# Patient Record
Sex: FEMALE | Race: WHITE | NOT HISPANIC OR LATINO | Employment: OTHER | URBAN - METROPOLITAN AREA
[De-identification: names, ages, dates, MRNs, and addresses within clinical notes are randomized per-mention and may not be internally consistent; named-entity substitution may affect disease eponyms.]

---

## 2017-04-27 NOTE — PATIENT DISCUSSION
POAG, OU: INTRAOCULAR PRESSURE IS WITHIN ACCEPTABLE LIMITS. PT INSTRUCTED TO CONTINUE LUMIGAN AND TIMOLOL AND RETURN FOR FOLLOW-UP AS SCHEDULED.

## 2017-06-30 NOTE — PATIENT DISCUSSION
POAG, OU: INTRAOCULAR PRESSURE IS WITHIN ACCEPTABLE LIMITS, HVFS STABLE- NONSPECIFIC CHANGES OD- WATCH CLOSELY, OCT STABLE AT LAST EXAM, IOPS LOW TODAY. PT INSTRUCTED TO CONTINUE TIMIOLOL BID OU &amp; LUMIGAN QHS OU AND RETURN FOR FOLLOW-UP AS SCHEDULED.

## 2018-01-18 NOTE — PATIENT DISCUSSION
POAG, OU: INTRAOCULAR PRESSURE IS WITHIN ACCEPTABLE LIMITS. PT INSTRUCTED TO CONTINUE LUMIGAN AND TIMOLOL  OU AND RETURN FOR FOLLOW-UP AS SCHEDULED.

## 2019-05-06 NOTE — PATIENT DISCUSSION
POAG, OU: INTRAOCULAR PRESSURE IS WITHIN ACCEPTABLE LIMITS. PT INSTRUCTED TO CONTINUE LUMIGAN QHS OU AND TIMOLOL BID OU- (ESCRIBED TODAY) AND RETURN FOR FOLLOW-UP AS SCHEDULED.

## 2019-09-13 NOTE — PATIENT DISCUSSION
POAG, OU: INTRAOCULAR PRESSURE IS WITHIN ACCEPTABLE LIMITS. HVFS STABLE DEFECTS OU. PT INSTRUCTED TO CONTINUE LUMIGAN QHS OU AND TIMOLOL BID OU AND RETURN FOR FOLLOW-UP AS SCHEDULED.

## 2019-09-13 NOTE — PATIENT DISCUSSION
New Prescription: timolol maleate (timolol maleate): drops: 0.5% 1 drop twice a day as directed into both eyes 09-

## 2019-11-07 NOTE — PATIENT DISCUSSION
POAG, OU: INTRAOCULAR PRESSURE IS WITHIN ACCEPTABLE LIMITS. OCT STABLE. PT INSTRUCTED TO CONTINUE TIMOLOL BID OU AND LUMIGAN QHS OU AND RETURN FOR FOLLOW-UP AS SCHEDULED.

## 2019-11-07 NOTE — PATIENT DISCUSSION
Continue: timolol maleate (timolol maleate): drops: 0.5% 1 drop twice a day as directed into both eyes 09-

## 2020-06-08 NOTE — PATIENT DISCUSSION
New Prescription: timolol maleate (timolol maleate): drops: 0.5% 1 drop twice a day as directed into both eyes 06-

## 2020-06-08 NOTE — PATIENT DISCUSSION
POAG, OU: INTRAOCULAR PRESSURE IS WITHIN ACCEPTABLE LIMITS. PT INSTRUCTED TO CONTINUE TIMOLOL BID OU AND LUMIGAN QHS OU. E-SCRIBED REFILLS TODAY. RETURN FOR FOLLOW-UP AS SCHEDULED.

## 2021-03-19 NOTE — PATIENT DISCUSSION
Continue: timolol maleate (timolol maleate): drops: 0.5% 1 drop twice a day as directed into both eyes 06-

## 2021-03-19 NOTE — PATIENT DISCUSSION
POAG, OU: INTRAOCULAR PRESSURE IS WITHIN ACCEPTABLE LIMITS. HVF SHOWS POSSIBLE CHANGES OD, STABLE OS. PT INSTRUCTED TO CONTINUE TIMOLOL AND LUMIGAN AS DIRECTED AND RETURN FOR FOLLOW-UP AS SCHEDULED. DUE FOR RNFL AS WELL.

## 2022-08-05 ENCOUNTER — NEW PATIENT (OUTPATIENT)
Dept: URBAN - METROPOLITAN AREA CLINIC 33 | Facility: CLINIC | Age: 62
End: 2022-08-05

## 2022-08-05 VITALS
HEART RATE: 62 BPM | HEIGHT: 64 IN | SYSTOLIC BLOOD PRESSURE: 121 MMHG | WEIGHT: 118 LBS | DIASTOLIC BLOOD PRESSURE: 74 MMHG | BODY MASS INDEX: 20.14 KG/M2

## 2022-08-05 DIAGNOSIS — H33.321: ICD-10-CM

## 2022-08-05 DIAGNOSIS — H43.811: ICD-10-CM

## 2022-08-05 DIAGNOSIS — H35.362: ICD-10-CM

## 2022-08-05 DIAGNOSIS — H04.123: ICD-10-CM

## 2022-08-05 PROCEDURE — 67145 PROPH RTA DTCHMNT PC: CPT

## 2022-08-05 PROCEDURE — 99204 OFFICE O/P NEW MOD 45 MIN: CPT

## 2022-08-05 PROCEDURE — 92250 FUNDUS PHOTOGRAPHY W/I&R: CPT

## 2022-08-05 ASSESSMENT — VISUAL ACUITY
OD_CC: 20/30+1
OS_CC: 20/25

## 2022-08-05 ASSESSMENT — TONOMETRY
OD_IOP_MMHG: 15
OS_IOP_MMHG: 15

## 2022-09-02 ENCOUNTER — COMPREHENSIVE EXAM (OUTPATIENT)
Dept: URBAN - METROPOLITAN AREA CLINIC 33 | Facility: CLINIC | Age: 62
End: 2022-09-02

## 2022-09-02 DIAGNOSIS — H43.813: ICD-10-CM

## 2022-09-02 DIAGNOSIS — H33.321: ICD-10-CM

## 2022-09-02 DIAGNOSIS — H35.362: ICD-10-CM

## 2022-09-02 DIAGNOSIS — H04.123: ICD-10-CM

## 2022-09-02 PROCEDURE — 92250 FUNDUS PHOTOGRAPHY W/I&R: CPT

## 2022-09-02 PROCEDURE — 92012 INTRM OPH EXAM EST PATIENT: CPT

## 2022-09-02 ASSESSMENT — VISUAL ACUITY
OD_SC: 20/25-2
OS_SC: 20/20-1

## 2022-09-02 ASSESSMENT — TONOMETRY
OS_IOP_MMHG: 13
OD_IOP_MMHG: 15